# Patient Record
Sex: MALE | Race: WHITE | URBAN - METROPOLITAN AREA
[De-identification: names, ages, dates, MRNs, and addresses within clinical notes are randomized per-mention and may not be internally consistent; named-entity substitution may affect disease eponyms.]

---

## 2017-01-30 RX ORDER — FOSINOPRIL SODIUM 40 MG/1
TABLET ORAL
Qty: 90 TABLET | Refills: 0 | Status: SHIPPED | OUTPATIENT
Start: 2017-01-30 | End: 2017-05-16

## 2017-01-30 RX ORDER — HYDROCHLOROTHIAZIDE 12.5 MG/1
CAPSULE, GELATIN COATED ORAL
Qty: 90 CAPSULE | Refills: 0 | Status: SHIPPED | OUTPATIENT
Start: 2017-01-30

## 2017-02-20 PROBLEM — G47.00 INSOMNIA: Status: ACTIVE | Noted: 2017-02-20

## 2017-02-20 RX ORDER — PRAVASTATIN SODIUM 10 MG
10 TABLET ORAL DAILY
COMMUNITY
Start: 2015-03-11 | End: 2017-02-20 | Stop reason: CLARIF

## 2017-02-20 RX ORDER — SUCRALFATE 1 G/1
1 TABLET ORAL 3 TIMES DAILY
COMMUNITY
Start: 2013-09-10

## 2017-02-20 RX ORDER — TEMAZEPAM 7.5 MG/1
7.5 CAPSULE ORAL
COMMUNITY
Start: 2015-03-11

## 2017-02-20 RX ORDER — FOSINOPRIL SODIUM 40 MG/1
40 TABLET ORAL DAILY
COMMUNITY
End: 2017-05-16

## 2017-02-20 RX ORDER — BUDESONIDE AND FORMOTEROL FUMARATE DIHYDRATE 160; 4.5 UG/1; UG/1
AEROSOL RESPIRATORY (INHALATION)
COMMUNITY
Start: 2015-09-09

## 2017-02-20 RX ORDER — PRAVASTATIN SODIUM 10 MG
TABLET ORAL
Qty: 90 TABLET | Refills: 0 | Status: SHIPPED | OUTPATIENT
Start: 2017-02-20 | End: 2017-05-16

## 2017-02-20 RX ORDER — ASPIRIN 81 MG/1
325 TABLET ORAL DAILY
COMMUNITY

## 2017-02-20 RX ORDER — LANSOPRAZOLE 30 MG/1
30 CAPSULE, DELAYED RELEASE ORAL 2 TIMES DAILY
COMMUNITY
Start: 2014-02-07

## 2017-02-21 ENCOUNTER — TELEPHONE (OUTPATIENT)
Dept: FAMILY MEDICINE CLINIC | Facility: CLINIC | Age: 77
End: 2017-02-21

## 2017-02-21 NOTE — TELEPHONE ENCOUNTER
Patient states that he is only having the SOB upon exercising, if he is not exercising he feels fine. He states that he is all right with waiting until tomorrow to be seen.     Recommended that if his symptoms increase or SOB starts while resting before be

## 2017-02-22 ENCOUNTER — LAB ENCOUNTER (OUTPATIENT)
Dept: LAB | Age: 77
End: 2017-02-22
Attending: FAMILY MEDICINE
Payer: MEDICARE

## 2017-02-22 ENCOUNTER — HOSPITAL ENCOUNTER (OUTPATIENT)
Dept: GENERAL RADIOLOGY | Age: 77
Discharge: HOME | End: 2017-02-22
Attending: FAMILY MEDICINE
Payer: MEDICARE

## 2017-02-22 ENCOUNTER — OFFICE VISIT (OUTPATIENT)
Dept: FAMILY MEDICINE CLINIC | Facility: CLINIC | Age: 77
End: 2017-02-22

## 2017-02-22 VITALS
HEIGHT: 65 IN | RESPIRATION RATE: 17 BRPM | SYSTOLIC BLOOD PRESSURE: 128 MMHG | OXYGEN SATURATION: 95 % | TEMPERATURE: 98 F | WEIGHT: 168 LBS | BODY MASS INDEX: 27.99 KG/M2 | DIASTOLIC BLOOD PRESSURE: 78 MMHG | HEART RATE: 92 BPM

## 2017-02-22 DIAGNOSIS — R06.00 DYSPNEA ON EXERTION: Primary | ICD-10-CM

## 2017-02-22 DIAGNOSIS — R06.00 DYSPNEA ON EXERTION: ICD-10-CM

## 2017-02-22 DIAGNOSIS — R07.89 CHEST HEAVINESS: ICD-10-CM

## 2017-02-22 LAB
ALBUMIN SERPL-MCNC: 3.9 G/DL (ref 3.5–4.8)
ALP LIVER SERPL-CCNC: 86 U/L (ref 45–117)
ALT SERPL-CCNC: 33 U/L (ref 17–63)
AST SERPL-CCNC: 24 U/L (ref 15–41)
BASOPHILS # BLD AUTO: 0.03 X10(3) UL (ref 0–0.1)
BASOPHILS NFR BLD AUTO: 0.4 %
BILIRUB SERPL-MCNC: 0.3 MG/DL (ref 0.1–2)
BUN BLD-MCNC: 23 MG/DL (ref 8–20)
CALCIUM BLD-MCNC: 9.3 MG/DL (ref 8.3–10.3)
CHLORIDE: 96 MMOL/L (ref 101–111)
CHOLEST SMN-MCNC: 147 MG/DL (ref ?–200)
CO2: 28 MMOL/L (ref 22–32)
CREAT BLD-MCNC: 1.2 MG/DL (ref 0.7–1.3)
EOSINOPHIL # BLD AUTO: 0.23 X10(3) UL (ref 0–0.3)
EOSINOPHIL NFR BLD AUTO: 2.9 %
ERYTHROCYTE [DISTWIDTH] IN BLOOD BY AUTOMATED COUNT: 13.8 % (ref 11.5–16)
GLUCOSE BLD-MCNC: 110 MG/DL (ref 70–99)
HCT VFR BLD AUTO: 42.6 % (ref 37–53)
HDLC SERPL-MCNC: 39 MG/DL (ref 45–?)
HDLC SERPL: 3.77 {RATIO} (ref ?–4.97)
HGB BLD-MCNC: 14.3 G/DL (ref 13–17)
IMMATURE GRANULOCYTE COUNT: 0.04 X10(3) UL (ref 0–1)
IMMATURE GRANULOCYTE RATIO %: 0.5 %
LDLC SERPL CALC-MCNC: 71 MG/DL (ref ?–130)
LYMPHOCYTES # BLD AUTO: 1.86 X10(3) UL (ref 0.9–4)
LYMPHOCYTES NFR BLD AUTO: 23.8 %
M PROTEIN MFR SERPL ELPH: 7.6 G/DL (ref 6.1–8.3)
MCH RBC QN AUTO: 28.6 PG (ref 27–33.2)
MCHC RBC AUTO-ENTMCNC: 33.6 G/DL (ref 31–37)
MCV RBC AUTO: 85.2 FL (ref 80–99)
MONOCYTES # BLD AUTO: 0.9 X10(3) UL (ref 0.1–0.6)
MONOCYTES NFR BLD AUTO: 11.5 %
NEUTROPHIL ABS PRELIM: 4.76 X10 (3) UL (ref 1.3–6.7)
NEUTROPHILS # BLD AUTO: 4.76 X10(3) UL (ref 1.3–6.7)
NEUTROPHILS NFR BLD AUTO: 60.9 %
NONHDLC SERPL-MCNC: 108 MG/DL (ref ?–130)
PLATELET # BLD AUTO: 204 10(3)UL (ref 150–450)
POTASSIUM SERPL-SCNC: 4.3 MMOL/L (ref 3.6–5.1)
RBC # BLD AUTO: 5 X10(6)UL (ref 3.8–5.8)
RED CELL DISTRIBUTION WIDTH-SD: 42.5 FL (ref 35.1–46.3)
SODIUM SERPL-SCNC: 133 MMOL/L (ref 136–144)
TRIGLYCERIDES: 183 MG/DL (ref ?–150)
TSI SER-ACNC: 4.19 MIU/ML (ref 0.35–5.5)
VLDL: 37 MG/DL (ref 5–40)
WBC # BLD AUTO: 7.8 X10(3) UL (ref 4–13)

## 2017-02-22 PROCEDURE — 99215 OFFICE O/P EST HI 40 MIN: CPT | Performed by: FAMILY MEDICINE

## 2017-02-22 PROCEDURE — 71020 XR CHEST PA + LAT CHEST (CPT=71020): CPT

## 2017-02-22 PROCEDURE — 80053 COMPREHEN METABOLIC PANEL: CPT

## 2017-02-22 PROCEDURE — 80061 LIPID PANEL: CPT

## 2017-02-22 PROCEDURE — 84443 ASSAY THYROID STIM HORMONE: CPT

## 2017-02-22 PROCEDURE — 85025 COMPLETE CBC W/AUTO DIFF WBC: CPT

## 2017-02-22 PROCEDURE — 93000 ELECTROCARDIOGRAM COMPLETE: CPT | Performed by: FAMILY MEDICINE

## 2017-02-22 NOTE — PATIENT INSTRUCTIONS
Chest xray and ekg today look ok. Call out to dr. Anisa Kauffman - will plan direction pending his call. Continue with current medications including aspirin daily.      If discomfort returns and does not resolve within 15 - 20 minutes, then go to Emergency

## 2017-02-23 ENCOUNTER — TELEPHONE (OUTPATIENT)
Dept: FAMILY MEDICINE CLINIC | Facility: CLINIC | Age: 77
End: 2017-02-23

## 2017-02-23 NOTE — PROGRESS NOTES
[facility] [place]  PROGRESS NOTE  Chief Complaint:   Patient presents with:  Shortness Of Breath: symptom started yesterday after exercise       HPI:   This is a 68year old male coming in for 1 week history of shortness of breath.   Patient sta 90 capsule Rfl: 0   FOSINOPRIL SODIUM 40 MG Oral Tab TAKE ONE TABLET BY MOUTH EVERY DAY Disp: 90 tablet Rfl: 0        Allergies:    No Known Allergies          Counseling given: Not Answered       REVIEW OF SYSTEMS:   CONSTITUTIONAL:  Denies , fever, chill no thyromegaly. SKIN: No rashes, no skin lesion, no bruising, good turgor. HEART:  Regular rate and rhythm, no murmurs,     LUNGS: Clear to auscultation bilterally, no rales/rhonchi/wheezing.     ABDOMEN:  Soft, nondistended, nontender, bowel sounds nor pacemaker in situ     Asthma     Coronary atherosclerosis     Esophageal reflux     Cardiac conduction disorder     Pure hypercholesterolemia     Benign essential hypertension     Insomnia      CHRIS GUTIERREZ MD  2/23/2017  6:41 AM

## 2017-02-23 NOTE — TELEPHONE ENCOUNTER
Talked to [facility]. Advised that this information was faxed to Herminia Faye at [phone].

## 2017-02-23 NOTE — TELEPHONE ENCOUNTER
Faxed office visit of 2/22/17, labs of 2/22/17 and EKG of 2/22/17 to Lucila Delgado at Dr. Kathleen Stevenson office at [phone] v.o.  Dr. Kailash Vázquez,

## 2017-02-24 ENCOUNTER — TELEPHONE (OUTPATIENT)
Dept: FAMILY MEDICINE CLINIC | Facility: CLINIC | Age: 77
End: 2017-02-24

## 2017-02-25 NOTE — TELEPHONE ENCOUNTER
----- Message from Denise Shane MD sent at 2/22/2017 10:44 PM CST -----  Labs reviewed - ok   Will forward labs, OV , ekg and cxr to dr. Quique Pahceco

## 2017-04-21 PROBLEM — Z95.1 S/P CABG X 4: Status: ACTIVE | Noted: 2017-04-21

## 2017-05-11 ENCOUNTER — LAB ENCOUNTER (OUTPATIENT)
Dept: LAB | Age: 77
End: 2017-05-11
Attending: FAMILY MEDICINE
Payer: MEDICARE

## 2017-05-11 DIAGNOSIS — I25.110 ATHEROSCLEROSIS OF CORONARY ARTERY OF NATIVE HEART WITH UNSTABLE ANGINA PECTORIS, UNSPECIFIED VESSEL OR LESION TYPE (HCC): ICD-10-CM

## 2017-05-11 DIAGNOSIS — E78.00 HYPERCHOLESTEREMIA: ICD-10-CM

## 2017-05-11 DIAGNOSIS — I10 BENIGN ESSENTIAL HYPERTENSION: ICD-10-CM

## 2017-05-11 DIAGNOSIS — E78.00 PURE HYPERCHOLESTEROLEMIA: ICD-10-CM

## 2017-05-11 DIAGNOSIS — R74.8 ELEVATED CPK: ICD-10-CM

## 2017-05-12 DIAGNOSIS — I25.110 ATHEROSCLEROSIS OF CORONARY ARTERY OF NATIVE HEART WITH UNSTABLE ANGINA PECTORIS, UNSPECIFIED VESSEL OR LESION TYPE (HCC): ICD-10-CM

## 2017-05-12 DIAGNOSIS — I10 BENIGN ESSENTIAL HYPERTENSION: ICD-10-CM

## 2017-05-12 DIAGNOSIS — E78.00 PURE HYPERCHOLESTEROLEMIA: Primary | ICD-10-CM

## 2017-05-12 PROCEDURE — 84443 ASSAY THYROID STIM HORMONE: CPT

## 2017-05-12 PROCEDURE — 80053 COMPREHEN METABOLIC PANEL: CPT

## 2017-05-12 PROCEDURE — 80061 LIPID PANEL: CPT

## 2017-05-12 PROCEDURE — 82550 ASSAY OF CK (CPK): CPT

## 2017-05-12 PROCEDURE — 82553 CREATINE MB FRACTION: CPT

## 2017-05-12 PROCEDURE — 85025 COMPLETE CBC W/AUTO DIFF WBC: CPT

## 2017-05-12 PROCEDURE — 36415 COLL VENOUS BLD VENIPUNCTURE: CPT

## 2017-05-16 ENCOUNTER — OFFICE VISIT (OUTPATIENT)
Dept: FAMILY MEDICINE CLINIC | Facility: CLINIC | Age: 77
End: 2017-05-16

## 2017-05-16 VITALS
SYSTOLIC BLOOD PRESSURE: 138 MMHG | BODY MASS INDEX: 26.03 KG/M2 | DIASTOLIC BLOOD PRESSURE: 70 MMHG | TEMPERATURE: 98 F | RESPIRATION RATE: 16 BRPM | HEIGHT: 66 IN | HEART RATE: 88 BPM | WEIGHT: 162 LBS

## 2017-05-16 DIAGNOSIS — Z00.00 HEALTH CARE MAINTENANCE: Primary | ICD-10-CM

## 2017-05-16 DIAGNOSIS — R74.8 ELEVATED CPK: ICD-10-CM

## 2017-05-16 DIAGNOSIS — I25.110 ATHEROSCLEROSIS OF CORONARY ARTERY OF NATIVE HEART WITH UNSTABLE ANGINA PECTORIS, UNSPECIFIED VESSEL OR LESION TYPE (HCC): ICD-10-CM

## 2017-05-16 DIAGNOSIS — E78.00 HYPERCHOLESTEREMIA: ICD-10-CM

## 2017-05-16 PROCEDURE — 99397 PER PM REEVAL EST PAT 65+ YR: CPT | Performed by: FAMILY MEDICINE

## 2017-05-16 PROCEDURE — G0439 PPPS, SUBSEQ VISIT: HCPCS | Performed by: FAMILY MEDICINE

## 2017-05-16 PROCEDURE — 96160 PT-FOCUSED HLTH RISK ASSMT: CPT | Performed by: FAMILY MEDICINE

## 2017-05-16 RX ORDER — FAMOTIDINE 20 MG/1
20 TABLET ORAL 2 TIMES DAILY
COMMUNITY

## 2017-05-16 RX ORDER — PRAVASTATIN SODIUM 10 MG
10 TABLET ORAL
Qty: 90 TABLET | Refills: 3 | Status: SHIPPED
Start: 2017-05-16

## 2017-05-16 RX ORDER — ZOLPIDEM TARTRATE 5 MG/1
5 TABLET ORAL NIGHTLY PRN
Qty: 30 TABLET | Refills: 2 | Status: SHIPPED | OUTPATIENT
Start: 2017-05-16

## 2017-05-16 RX ORDER — CETIRIZINE HYDROCHLORIDE 10 MG/1
10 TABLET ORAL DAILY
COMMUNITY

## 2017-05-16 RX ORDER — ZOLPIDEM TARTRATE 5 MG/1
5 TABLET ORAL NIGHTLY PRN
COMMUNITY

## 2017-05-17 DIAGNOSIS — E78.00 PURE HYPERCHOLESTEROLEMIA: ICD-10-CM

## 2017-05-17 DIAGNOSIS — R74.8 ELEVATED CPK: Primary | ICD-10-CM

## 2017-05-17 NOTE — PROGRESS NOTES
[facility] [place]  PROGRESS NOTE  Chief Complaint:   Patient presents with:  Physical      HPI:   This is a 68year old male coming in for [facility] care check. Recent had labs- reviewed.  Discussed ca early detection - pt with colonosocpy in 2009 4. 15 1.30-6.70 x10(3) uL   Lymphocyte Absolute 1.26 0.90-4.00 x10(3) uL   Monocyte Absolute 0.60 0.10-0.60 x10(3) uL   Eosinophil Absolute 0.23 0.00-0.30 x10(3) uL   Basophil Absolute 0.02 0.00-0.10 x10(3) uL   Immature Granulocyte Absolute 0.02 0.00-1.00 the lungs. 2 puffs bid Disp:  Rfl:    sucralfate 1 g Oral Tab Take 1 g by mouth 3 (three) times daily. Disp:  Rfl:    PROAIR  (90 Base) MCG/ACT Inhalation Aero Soln Inhale into the lungs.  Disp:  Rfl: 3   furosemide 20 MG Oral Tab Take 20 mg by mouth Temp(Src) 98.4 °F (36.9 °C) (Tympanic)  Resp 16  Ht 66\"  Wt 162 lb  BMI 26.16 kg/m2 Estimated body mass index is 26.16 kg/(m^2) as calculated from the following:    Height as of this encounter: 66\". Weight as of this encounter: 162 lb.    Vital signs r tablet 3      Sig: Take 1 tablet (10 mg total) by mouth once daily. metoprolol Tartrate 25 MG Oral Tab 180 tablet 3      Sig: Take 1 tablet (25 mg total) by mouth 2 (two) times daily.            Patient Instructions   Good exam today     Refill for Amb

## 2017-07-25 ENCOUNTER — TELEPHONE (OUTPATIENT)
Dept: FAMILY MEDICINE CLINIC | Facility: CLINIC | Age: 77
End: 2017-07-25

## 2017-07-25 NOTE — TELEPHONE ENCOUNTER
Patient states that he will be going to an assisted living facility in [place] where his immediate family is and he leaves on Tuesday (8/1/17).   He was just seen for his physical with Dr. Sugey Mtz and wanted to make sure that Dr. Sugey Mtz will complete the p

## 2017-07-25 NOTE — TELEPHONE ENCOUNTER
Patient is wanting to move into an assisted living facility in [address] next week- said that there will be a fax or call regarding this coming in for dr jones- wants to know if that paperwork can be filled out without having to be seen

## 2017-07-26 NOTE — TELEPHONE ENCOUNTER
Patient informed that as soon as Dr. Noemy Salomon receives the paperwork for assisted living facility he will complete it and fax it back to the appropriate fax number as per Dr. Edda Robertson.

## 2019-05-03 ENCOUNTER — TELEPHONE (OUTPATIENT)
Dept: FAMILY MEDICINE CLINIC | Facility: CLINIC | Age: 79
End: 2019-05-03

## 2019-08-29 ENCOUNTER — TELEPHONE (OUTPATIENT)
Dept: FAMILY MEDICINE CLINIC | Facility: CLINIC | Age: 79
End: 2019-08-29

## 2019-08-29 NOTE — TELEPHONE ENCOUNTER
FYI:    Pt contacted the Patient Customer Service with the following comments. Comments: Today I received yet another phone call asking me to make an appointment.  So I tell you again that I now live in [place] and will not be coming to [facility] for my he

## 2019-08-30 ENCOUNTER — PATIENT MESSAGE (OUTPATIENT)
Dept: FAMILY MEDICINE CLINIC | Facility: CLINIC | Age: 79
End: 2019-08-30

## 2020-07-08 ENCOUNTER — TELEPHONE (OUTPATIENT)
Dept: FAMILY MEDICINE CLINIC | Facility: CLINIC | Age: 80
End: 2020-07-08

## 2020-07-13 ENCOUNTER — TELEPHONE (OUTPATIENT)
Dept: CASE MANAGEMENT | Age: 80
End: 2020-07-13

## 2020-07-13 NOTE — TELEPHONE ENCOUNTER
Patient returned call and states that he now lives in [place] and will not be seeing Dr Nessa Calloway. He refused to update his address and requests that no further calls be made to him.  I advised pt to contact his insurance company to update them on this change

## 2020-07-13 NOTE — TELEPHONE ENCOUNTER
Patient is eligible for a 2020 Medicare Advantage Supervisit. LOV 2017. Left message to call back [phone].

## (undated) NOTE — MR AVS SNAPSHOT
[address]  [address] [id number]  [phone]               Thank you for choosing us for your health care visit with Christy Tang MD.  We are glad to serve you and happy to provide you with this summary of Instructions: To schedule an appointment for your radiology test please call [facility] Sched[facility] at [phone].          Reason for Today's Visit     Shortness Of Breath           Medical Issues Discussed Today     Dyspnea on exertion    - Generic drug:  Budesonide-Formoterol Fumarate   Inhale into the lungs. 2 puffs bid           temazepam 7.5 MG Caps   Take 7.5 mg by mouth daily as needed. Commonly known as:  RESTORIL           * Notice:   This list has 2 medication(s) that are the same a

## (undated) NOTE — MR AVS SNAPSHOT
[address]  [address] [address] [id number] [phone]               Thank you for choosing us for your health care visit with Stephania Tran MD.  We are glad to serve you and happy to provide you with this summary of DOCQLACE 100 MG Caps   Generic drug:  docusate sodium   Take 100 mg by mouth daily. furosemide 20 MG Tabs   Take 20 mg by mouth daily.    Commonly known as:  LASIX           hydrochlorothiazide 12.5 MG Caps   TAKE ONE CAPSULE BY MOUTH EVERY DAY [place] [phone], [phone]  [address], [address]     Phone:  [phone]    - metoprolol Tartrate 25 MG Tabs  - Pravastatin Sodium 10 MG Tabs      You can get these medications from any pharmacy     Bring a paper prescription for each o Start activities slowly and build up over time Do what you like   Get your heart pumping – brisk walking, biking, swimming Even 10 minute increments are effective and add up over the week   2 ½ hours per week – spread out over time Use a zeke to keep you